# Patient Record
Sex: MALE | Race: BLACK OR AFRICAN AMERICAN | NOT HISPANIC OR LATINO | Employment: STUDENT | URBAN - METROPOLITAN AREA
[De-identification: names, ages, dates, MRNs, and addresses within clinical notes are randomized per-mention and may not be internally consistent; named-entity substitution may affect disease eponyms.]

---

## 2023-07-18 ENCOUNTER — CLINICAL SUPPORT (OUTPATIENT)
Dept: URGENT CARE | Facility: CLINIC | Age: 18
End: 2023-07-18

## 2023-07-18 VITALS — RESPIRATION RATE: 20 BRPM

## 2023-07-18 DIAGNOSIS — Z11.1 ENCOUNTER FOR PPD TEST: Primary | ICD-10-CM

## 2023-07-18 PROCEDURE — 86580 TB INTRADERMAL TEST: CPT | Mod: ,,, | Performed by: FAMILY MEDICINE

## 2023-07-18 PROCEDURE — 86580 POCT TB SKIN TEST: ICD-10-PCS | Mod: ,,, | Performed by: FAMILY MEDICINE

## 2023-07-18 NOTE — LETTER
July 20, 2023    Brayan Mota  [address] [address]             [facility]  Urgent Care  [address], [address]  [facility] [address]  Phone: [phone]  Fax: [phone]   Dear Mr. Brayan Mota:    Below are the results from your recent visit:    PPD Reading Note  PPD read and results entered in [employer].  Result: 0 mm induration.  Interpretation: NEGATIVE  If test not read within 48-72 hours of initial placement, patient advised to repeat in other arm 1-3 weeks after this test.  Allergic reaction: no       Sincerely,        Cristine Vázquez MA

## 2023-07-18 NOTE — PROGRESS NOTES
.PPD Placement note    Brayan Mota, 17 y.o. male is here today for placement of PPD test    Reason for PPD test: School     Pt taken PPD test before: yes    Verified in allergy area and with patient that they are not allergic to the products PPD is made of (Phenol or Tween). No:     Is patient taking any oral or IV steroid medication now or have they taken it in the last month? no    Has the patient ever received the BCG vaccine?: no    Has the patient been in recent contact with anyone known or suspected of having active TB disease?: no    Date of exposure (if applicable): N/A    Name of person they were exposed to (if applicable): N/A    Patient's Country of origin?: [place]    O: Alert and oriented in NAD.    P: PPD placed on 7/18/2023. Patient advised to return for reading within 48-72 hours.

## 2023-07-20 LAB
TB INDURATION - 48 HR READ: 0 MM
TB INDURATION - 72 HR READ: NORMAL
TB SKIN TEST - 48 HR READ: NEGATIVE
TB SKIN TEST - 72 HR READ: NORMAL

## 2023-07-20 NOTE — PROGRESS NOTES
PPD Reading Note  PPD read and results entered in [employer].  Result: 0 mm induration.  Interpretation: NEGATIVE  If test not read within 48-72 hours of initial placement, patient advised to repeat in other arm 1-3 weeks after this test.  Allergic reaction: no